# Patient Record
Sex: FEMALE | Race: WHITE | NOT HISPANIC OR LATINO | Employment: UNEMPLOYED | URBAN - METROPOLITAN AREA
[De-identification: names, ages, dates, MRNs, and addresses within clinical notes are randomized per-mention and may not be internally consistent; named-entity substitution may affect disease eponyms.]

---

## 2022-03-01 ENCOUNTER — APPOINTMENT (OUTPATIENT)
Dept: CT IMAGING | Facility: CLINIC | Age: 62
End: 2022-03-01
Payer: COMMERCIAL

## 2022-03-01 ENCOUNTER — HOSPITAL ENCOUNTER (EMERGENCY)
Facility: CLINIC | Age: 62
Discharge: HOME | End: 2022-03-01
Attending: EMERGENCY MEDICINE | Admitting: EMERGENCY MEDICINE
Payer: COMMERCIAL

## 2022-03-01 VITALS
RESPIRATION RATE: 11 BRPM | HEART RATE: 61 BPM | BODY MASS INDEX: 32.49 KG/M2 | TEMPERATURE: 98.3 F | OXYGEN SATURATION: 95 % | SYSTOLIC BLOOD PRESSURE: 146 MMHG | WEIGHT: 195 LBS | HEIGHT: 65 IN | DIASTOLIC BLOOD PRESSURE: 94 MMHG

## 2022-03-01 DIAGNOSIS — R42 DIZZINESS: Primary | ICD-10-CM

## 2022-03-01 DIAGNOSIS — R07.9 CHEST PAIN, UNSPECIFIED TYPE: ICD-10-CM

## 2022-03-01 DIAGNOSIS — J06.9 UPPER RESPIRATORY TRACT INFECTION, UNSPECIFIED TYPE: ICD-10-CM

## 2022-03-01 DIAGNOSIS — E87.1 HYPONATREMIA: ICD-10-CM

## 2022-03-01 LAB
ALBUMIN SERPL-MCNC: 4.3 G/DL (ref 3.4–5)
ALP SERPL-CCNC: 81 U/L (ref 40–150)
ALT SERPL W P-5'-P-CCNC: 20 U/L (ref 0–50)
ANION GAP SERPL CALCULATED.3IONS-SCNC: 5 MMOL/L (ref 3–14)
AST SERPL W P-5'-P-CCNC: 23 U/L (ref 0–45)
ATRIAL RATE - MUSE: 67 BPM
BASOPHILS # BLD AUTO: 0.1 10E3/UL (ref 0–0.2)
BASOPHILS NFR BLD AUTO: 1 %
BILIRUB SERPL-MCNC: 2.1 MG/DL (ref 0.2–1.3)
BUN SERPL-MCNC: 13 MG/DL (ref 7–30)
CALCIUM SERPL-MCNC: 9.3 MG/DL (ref 8.5–10.1)
CHLORIDE BLD-SCNC: 97 MMOL/L (ref 94–109)
CO2 SERPL-SCNC: 29 MMOL/L (ref 20–32)
CREAT SERPL-MCNC: 0.79 MG/DL (ref 0.52–1.04)
DIASTOLIC BLOOD PRESSURE - MUSE: NORMAL MMHG
EOSINOPHIL # BLD AUTO: 0.2 10E3/UL (ref 0–0.7)
EOSINOPHIL NFR BLD AUTO: 4 %
ERYTHROCYTE [DISTWIDTH] IN BLOOD BY AUTOMATED COUNT: 12.8 % (ref 10–15)
GFR SERPL CREATININE-BSD FRML MDRD: 84 ML/MIN/1.73M2
GLUCOSE BLD-MCNC: 101 MG/DL (ref 70–99)
HCT VFR BLD AUTO: 43 % (ref 35–47)
HGB BLD-MCNC: 14.2 G/DL (ref 11.7–15.7)
HOLD SPECIMEN: NORMAL
HOLD SPECIMEN: NORMAL
IMM GRANULOCYTES # BLD: 0 10E3/UL
IMM GRANULOCYTES NFR BLD: 0 %
INTERPRETATION ECG - MUSE: NORMAL
LYMPHOCYTES # BLD AUTO: 1.7 10E3/UL (ref 0.8–5.3)
LYMPHOCYTES NFR BLD AUTO: 28 %
MCH RBC QN AUTO: 30.1 PG (ref 26.5–33)
MCHC RBC AUTO-ENTMCNC: 33 G/DL (ref 31.5–36.5)
MCV RBC AUTO: 91 FL (ref 78–100)
MONOCYTES # BLD AUTO: 0.4 10E3/UL (ref 0–1.3)
MONOCYTES NFR BLD AUTO: 6 %
NEUTROPHILS # BLD AUTO: 3.6 10E3/UL (ref 1.6–8.3)
NEUTROPHILS NFR BLD AUTO: 61 %
NRBC # BLD AUTO: 0 10E3/UL
NRBC BLD AUTO-RTO: 0 /100
P AXIS - MUSE: 75 DEGREES
PLATELET # BLD AUTO: 291 10E3/UL (ref 150–450)
POTASSIUM BLD-SCNC: 4.1 MMOL/L (ref 3.4–5.3)
PR INTERVAL - MUSE: 182 MS
PROT SERPL-MCNC: 8.6 G/DL (ref 6.8–8.8)
QRS DURATION - MUSE: 92 MS
QT - MUSE: 420 MS
QTC - MUSE: 443 MS
R AXIS - MUSE: 69 DEGREES
RBC # BLD AUTO: 4.72 10E6/UL (ref 3.8–5.2)
SODIUM SERPL-SCNC: 131 MMOL/L (ref 133–144)
SYSTOLIC BLOOD PRESSURE - MUSE: NORMAL MMHG
T AXIS - MUSE: 65 DEGREES
TROPONIN I SERPL HS-MCNC: 9 NG/L
VENTRICULAR RATE- MUSE: 67 BPM
WBC # BLD AUTO: 5.9 10E3/UL (ref 4–11)

## 2022-03-01 PROCEDURE — 96374 THER/PROPH/DIAG INJ IV PUSH: CPT

## 2022-03-01 PROCEDURE — 70450 CT HEAD/BRAIN W/O DYE: CPT

## 2022-03-01 PROCEDURE — 96361 HYDRATE IV INFUSION ADD-ON: CPT

## 2022-03-01 PROCEDURE — 84484 ASSAY OF TROPONIN QUANT: CPT | Performed by: EMERGENCY MEDICINE

## 2022-03-01 PROCEDURE — 250N000011 HC RX IP 250 OP 636

## 2022-03-01 PROCEDURE — 99285 EMERGENCY DEPT VISIT HI MDM: CPT | Mod: 25

## 2022-03-01 PROCEDURE — 36415 COLL VENOUS BLD VENIPUNCTURE: CPT | Performed by: EMERGENCY MEDICINE

## 2022-03-01 PROCEDURE — 93005 ELECTROCARDIOGRAM TRACING: CPT

## 2022-03-01 PROCEDURE — 258N000003 HC RX IP 258 OP 636

## 2022-03-01 PROCEDURE — 85025 COMPLETE CBC W/AUTO DIFF WBC: CPT | Performed by: EMERGENCY MEDICINE

## 2022-03-01 PROCEDURE — 80053 COMPREHEN METABOLIC PANEL: CPT | Performed by: EMERGENCY MEDICINE

## 2022-03-01 RX ORDER — KETOROLAC TROMETHAMINE 15 MG/ML
30 INJECTION, SOLUTION INTRAMUSCULAR; INTRAVENOUS ONCE
Status: COMPLETED | OUTPATIENT
Start: 2022-03-01 | End: 2022-03-01

## 2022-03-01 RX ORDER — DIAZEPAM 5 MG
2.5 TABLET ORAL EVERY 6 HOURS PRN
Qty: 10 TABLET | Refills: 0 | Status: SHIPPED | OUTPATIENT
Start: 2022-03-01

## 2022-03-01 RX ADMIN — SODIUM CHLORIDE 1000 ML: 9 INJECTION, SOLUTION INTRAVENOUS at 09:17

## 2022-03-01 RX ADMIN — KETOROLAC TROMETHAMINE 30 MG: 15 INJECTION, SOLUTION INTRAMUSCULAR; INTRAVENOUS at 09:18

## 2022-03-01 ASSESSMENT — ENCOUNTER SYMPTOMS
NUMBNESS: 0
FEVER: 0
SHORTNESS OF BREATH: 0
SPEECH DIFFICULTY: 0
FATIGUE: 1
ABDOMINAL PAIN: 0
SORE THROAT: 0
HEADACHES: 1
NAUSEA: 1
FACIAL ASYMMETRY: 0
CONFUSION: 0
FREQUENCY: 0
COUGH: 1
NECK STIFFNESS: 1
VOMITING: 0
DYSURIA: 0

## 2022-03-01 NOTE — ED PROVIDER NOTES
History   Chief Complaint:  Generalized Weakness and Dizziness       HPI   Barby Heaton is a 61 year old female with history of vertigo and cervical stenosis who presents with a constellation of symptoms including, dizziness numbness and tingling in all extremities, weakness in arms fatigue.  The patient states she has had a bad cold and was seen on 2/21 in clinic for a fever of 101, cough and congestion.  Covid test was negative. She was seen again by Dr. Champagne at [facility] 2/25 for congestion and coughing and was prescribed a Z-Ryan, (which she did not finish) and prednisone (which she only took for 2 days because she did not like the way it made her feel).  On chart review, she did not take Azithromycin because she was concerned about QT prolongation risk. She comes in today complaining that when she got up at 4:30 AM, she experienced a hot flash feeling and a feeling like the world was going in slow motion. It was not like the room was spinning. She has pressure over the frontal aspect of her head and her neck feels somewhat stiff.  She has a history of vertigo and exercises for this and has been to vestibular therapy.  She states this constellation of symptoms feels worse than her typical vertigo episodes, in that she has experienced intermittent numbness and tingling and she feels very weak and tired.  She does not feel stable and feels like she may fall.  She also mentions that over the past week she has felt some discomfort in her chest that comes and goes.  She still has a lingering cough but has not had fevers in the past few days.    Review of Systems   Constitutional: Positive for fatigue. Negative for fever.   HENT: Positive for congestion. Negative for ear pain and sore throat.    Eyes: Negative for visual disturbance.   Respiratory: Positive for cough. Negative for shortness of breath.    Cardiovascular: Positive for chest pain.   Gastrointestinal: Positive for nausea. Negative for  "abdominal pain and vomiting.   Genitourinary: Negative for dysuria and frequency.   Musculoskeletal: Positive for gait problem and neck stiffness.   Neurological: Positive for headaches. Negative for syncope, facial asymmetry, speech difficulty and numbness.   Psychiatric/Behavioral: Negative for confusion.   All other systems reviewed and are negative.    Allergies:  No Known Allergies    Medications:  Lexapro    Past Medical History:     Anxiety      Past Surgical History:    No past surgical history on file.     Family History:    Mother- lung cancer  No family history of CVA or heart disease    Social History:  Single  Non smoker  No ETOH use  Follows with Dr. Ihsan Chan [facility]      Physical Exam     Patient Vitals for the past 24 hrs:   BP Temp Temp src Pulse Resp SpO2 Height Weight   03/01/22 1000 -- -- -- 61 11 95 % -- --   03/01/22 0959 -- -- -- 62 13 98 % -- --   03/01/22 0800 (!) 146/94 -- -- 63 16 -- -- --   03/01/22 0750 (!) 131/91 -- -- 64 14 -- -- --   03/01/22 0700 (!) 153/95 -- -- 74 -- 98 % -- --   03/01/22 0656 -- 98.3  F (36.8  C) Oral 76 16 96 % 1.651 m (5' 5\") 88.5 kg (195 lb)     Physical Exam  General: Adult female laying back on [facility] with lights off in the room.  HENT: Patient wearing face mask. When taken off, mucous membranes appear moist. TMs normal appearing- no erythema. Posterior oropharynx non erythematous, no exudate, uvula midline. No facial droop.  Eyes: Conjunctive and sclera clear. PERRLA. EOMs intact. No nystagmus.   Neck: Worsening dizziness illicited on bringing chin to chest on exam. No nuchal rigidity.  CV: Regular rate and rhythm. Normal S1, S2. No appreciable murmurs, gallops or rubs.  Resp: Lungs clear to auscultation bilaterally. Normal respiratory effort. Speaks in full sentences. No stridor or cough observed.  GI: Abdomen soft, non distended and nontender. No rebound or guarding.  MSK: Moves all extremities without difficulty. No lower extremity " edema.  Skin: Warm and dry.  Neuro: Awake, alert, oriented x3. Cranial nerves II - XII intact.Normal and fluent speech. Normal finger . Equal strength in all extremities.   Psych: Cooperative. Normal affect.    Emergency Department Course   ECG  ECG obtained at 0703, ECG read at 0705  Normal sinus rhythm. Normal ECG.  Rate 67 bpm. NC interval 182 ms. QRS duration 92 ms. QT/QTc 420/443 ms. P-R-T axes 75 69 65     Imaging:  Head CT w/o contrast   Final Result   IMPRESSION:   1.  No evidence of acute intracranial hemorrhage or mass effect.   2.  Mild nonspecific white matter changes.   3.  Mild brain parenchymal volume loss.      ZARINA GOLDBERG MD            SYSTEM ID:  [id number]        Report per radiology    Laboratory:  Labs Ordered and Resulted from Time of ED Arrival to Time of ED Departure   COMPREHENSIVE METABOLIC PANEL - Abnormal       Result Value    Sodium 131 (*)     Potassium 4.1      Chloride 97      Carbon Dioxide (CO2) 29      Anion Gap 5      Urea Nitrogen 13      Creatinine 0.79      Calcium 9.3      Glucose 101 (*)     Alkaline Phosphatase 81      AST 23      ALT 20      Protein Total 8.6      Albumin 4.3      Bilirubin Total 2.1 (*)     GFR Estimate 84     TROPONIN I - Normal    Troponin I High Sensitivity 9     CBC WITH PLATELETS AND DIFFERENTIAL    WBC Count 5.9      RBC Count 4.72      Hemoglobin 14.2      Hematocrit 43.0      MCV 91      MCH 30.1      MCHC 33.0      RDW 12.8      Platelet Count 291      % Neutrophils 61      % Lymphocytes 28      % Monocytes 6      % Eosinophils 4      % Basophils 1      % Immature Granulocytes 0      NRBCs per 100 WBC 0      Absolute Neutrophils 3.6      Absolute Lymphocytes 1.7      Absolute Monocytes 0.4      Absolute Eosinophils 0.2      Absolute Basophils 0.1      Absolute Immature Granulocytes 0.0      Absolute NRBCs 0.0        Emergency Department Course:    Reviewed:  I reviewed nursing notes, vitals and past medical history    Assessments:  0845 I  obtained history and examined the patient as noted above. I spoke to my attending physician about her case after.    1000   My attending provider rechecked the patient and explained findings of her labs, EKG and head CT       Interventions:  Medications   0.9% sodium chloride BOLUS (0 mLs Intravenous Stopped 3/1/22 1005)   ketorolac (TORADOL) injection 30 mg (30 mg Intravenous Given 3/1/22 0918)     Disposition:  The patient was discharged to home.     Impression & Plan     Medical Decision Making:  I saw and evaluated Barby for dizziness, headache, continuing cold symptoms, intermittent chest pain per detailed HPI above. Regarding her intermittent chest pain, her Troponin was negative and ECG demonstrated normal sinus rhythm, which was reassuring this does not represent acute coronary syndrome. She felt her dizziness was worse/different than her typical vertigo. Therefore, head CT was obtained and returned negative for infarction or any acute pathology. She felt reassured about this normal imaging. She had a completely normal neurologic exam. Regarding her typical dizziness episodes, she states Meclizine does not work for her and asks specifically for a low dose diazepam prescription. She states this has worked for her in the past for Vertigo. I also referred her to Vestibular Therapy to follow up with for PT. She was already seen for her URI in clinic recently. She was afebrile here and had no leukocytosis on CBC. Lungs were clear to auscultation bilaterally and it does not seem this has progressed to a pneumonia. Metabolic panel showed mildly low sodium and she was provided NS bolus to help replete. She was also given Toradol for her headache, which helped quite a bit. I recommended 650Tylenol + 400Ibuprofen q6 hours at home for headache management. Regarding her neck pain, she had a recent MRI showing some cervical stenosis. I referred her to Dr. Patel at [facility] to follow up with about her chronic neck pain.  Clinically, I discussed with my attending physician that her picture does not look like meningitis. She is afebrile, has a normal WBC, no neurologic dysfunction on exam and no impressive nuchal rigidity. All lab and imaging results were discussed and she was discharged hemodynamically stable in improved condition. She was able to ambulate in the room and expressed appreciation for the referrals and work up done. She will also follow up this week with Dr. Champagne for recheck. She knows she can return here with any new or worsening symptoms.    Diagnosis:    ICD-10-CM    1. Dizziness  R42 Physical Therapy Referral   2. Chest pain, unspecified type  R07.9    3. Upper respiratory tract infection, unspecified type  J06.9    4. Hyponatremia  E87.1     131 on labs 3/2/22, given NS bolus       Discharge Medications:  Discharge Medication List as of 3/1/2022 10:18 AM      START taking these medications    Details   diazepam (VALIUM) 5 MG tablet Take 0.5 tablets (2.5 mg) by mouth every 6 hours as needed for anxiety, Disp-10 tablet, R-0, Local Print             Glenda CASAREZ APC-T  I saw and evaluated this patient under attending provider Dr. Trierweiler Dewing, Glenda GASTELUM PA-C  03/01/22 1415

## 2022-03-01 NOTE — ED NOTES
Emergency Department Attending Supervision Note  3/1/2022  8:08 AM      I evaluated this patient in conjunction with Glenda Perrin PA-C      Briefly, the patient presented with weakness and dizziness. The patient presented one week ago to the clinic with fever, nasal congestion and headache. She tested negative for COVID-19, and was prescribed with azithromycin and prednisone. She has not finished azithromycin, and only took two days of her prednisone because it did not make her feel better. This morning at around 0430 she woke up with hot flashes. She felt whole body tingling, and felt stiff. She had intermittent tingling and numbness in the upper and lower extremities. She felt dizzy, and felt pressure in the frontal aspect of her head. She had neck stiffness, but is unsure if it is from her baseline cervical stenosis. Dizziness worsens with turning her head.       On my exam, ***    Results:    ED course:   I obtained history and examined patient    My impression is ***        Diagnosis  No diagnosis found.      Trierweiler, Chad A, MD

## 2022-03-01 NOTE — ED TRIAGE NOTES
Patient states feeling weak, dizzy.  Headache & neck pain.  Still having chest pain which has been intermittent since last week.  Chest cold, cough also.

## 2022-03-01 NOTE — ED PROVIDER NOTES
Emergency Department Attending Supervision Note  3/1/2022  8:08 AM      I evaluated this patient in conjunction with Glenda العلي PA-C.      Briefly, the patient presented with generalized weakness and lightheadedness.  She has been dealing with an upper respiratory infection, not feeling improved with prednisone and a Z-Ryan.  She is also been dealing with chronic neck pain.  With her vertigo feeling different than typical today, she presented to us to make sure there was not something more serious at play.      On my exam, she is somewhat anxious but otherwise resting comfortably in no obvious distress.  Cardiac: Regular rate and rhythm  Lungs: Clear to auscultation  Abd: Soft and benign  Neuro: 5 out of 5 strength in all extremities.  No cranial nerve deficits.  Able to ambulate without assistance.    Results: Thorough work-up was undertaken including unremarkable labs except for a mild hyponatremia at 131.  Head CT was obtained which shows no evidence of an intracranial process. EKG is normal.      My impression is lightheadedness and dizziness with unclear cause.  She was given a liter of fluid with some improvement in symptoms.  She finds that low-dose Valium seems to help with her symptoms of vertigo and lightheadedness and this will be prescribed.  Otherwise, we will have her follow-up with orthopedics for her neck pain.  I see no emergent pathology at this time and feel she is safe for discharge home.        Diagnosis    ICD-10-CM    1. Dizziness  R42 Physical Therapy Referral   2. Chest pain, unspecified type  R07.9    3. Upper respiratory tract infection, unspecified type  J06.9    4. Hyponatremia  E87.1     131 on labs 3/2/22, given NS bolus            Trierweiler, Chad A, MD  03/03/22 1014

## 2022-03-01 NOTE — DISCHARGE INSTRUCTIONS
Discharge Instructions  Dizziness (Lightheaded)  Today you were seen for dizziness.  Dizziness can be caused by many things and it can be very difficult to determine the cause of dizziness.  At this time, your provider has found no signs that your dizziness is due to a serious or life-threatening condition. However, sometimes there is a serious problem that does not show up right away, and it is important for you to follow up with your regular provider as instructed.  Generally, every Emergency Department visit should have a follow-up clinic visit with either a primary or a specialty clinic/provider. Please follow-up as instructed by your emergency provider today.      Return to the Emergency Department if:    You pass out (fainting or falling out), especially during exercise.    You develop chest pain, chest pressure or difficulty breathing.  Your feel an irregular heartbeat.  You have excessive vaginal bleeding, or blood in your stool or vomit (throw up).  You have a high fever.  Your symptoms get worse or more frequent.    If when you begin to feel dizzy or lightheaded, it is important to sit down or lay down immediately to prevent injury from falling.  If you were given a prescription for medicine here today, be sure to read all of the information (including the package insert) that comes with your prescription.  This will include important information about the medicine, its side effects, and any warnings that you need to know about.  The pharmacist who fills the prescription can provide more information and answer questions you may have about the medicine.  If you have questions or concerns that the pharmacist cannot address, please call or return to the Emergency Department.   Remember that you can always come back to the Emergency Department if you are not able to see your regular provider in the amount of time listed above, if you get any new symptoms, or if there is anything that worries  you.      Discharge Instructions  Chest Pain    You have been seen today for chest pain or discomfort.  At this time, your provider has found no signs that your chest pain is due to a serious or life-threatening condition, (or you have declined more testing and/or admission to the hospital). However, sometimes there is a serious problem that does not show up right away. Your evaluation today may not be complete and you may need further testing and evaluation.     Generally, every Emergency Department visit should have a follow-up clinic visit with either a primary or a specialty clinic/provider. Please follow-up as instructed by your emergency provider today.  Return to the Emergency Department if:  Your chest pain changes, gets worse, starts to happen more often, or comes with less activity.  You are newly short of breath.  You get very weak or tired.  You pass out or faint.  You have any new symptoms, like fever, cough, numb legs, or you cough up blood.  You have anything else that worries you.    Until you follow-up with your regular provider, please do the following:  Take one aspirin daily unless you have an allergy or are told not to by your provider.  If a stress test appointment has been made, go to the appointment.  If you have questions, contact your regular provider.  Follow-up with your regular provider/clinic as directed; this is very important.    If you were given a prescription for medicine here today, be sure to read all of the information (including the package insert) that comes with your prescription.  This will include important information about the medicine, its side effects, and any warnings that you need to know about.  The pharmacist who fills the prescription can provide more information and answer questions you may have about the medicine.  If you have questions or concerns that the pharmacist cannot address, please call or return to the Emergency Department.       Remember that you can  always come back to the Emergency Department if you are not able to see your regular provider in the amount of time listed above, if you get any new symptoms, or if there is anything that worries you.

## 2024-10-09 ENCOUNTER — LAB REQUISITION (OUTPATIENT)
Dept: LAB | Facility: CLINIC | Age: 64
End: 2024-10-09

## 2024-10-09 DIAGNOSIS — Z12.4 ENCOUNTER FOR SCREENING FOR MALIGNANT NEOPLASM OF CERVIX: ICD-10-CM

## 2024-10-09 PROCEDURE — 87624 HPV HI-RISK TYP POOLED RSLT: CPT | Performed by: PHYSICIAN ASSISTANT

## 2024-10-09 PROCEDURE — G0145 SCR C/V CYTO,THINLAYER,RESCR: HCPCS | Performed by: PHYSICIAN ASSISTANT

## 2024-10-10 LAB
HPV HR 12 DNA CVX QL NAA+PROBE: NEGATIVE
HPV16 DNA CVX QL NAA+PROBE: NEGATIVE
HPV18 DNA CVX QL NAA+PROBE: NEGATIVE
HUMAN PAPILLOMA VIRUS FINAL DIAGNOSIS: NORMAL

## 2024-10-14 LAB
BKR AP ASSOCIATED HPV REPORT: NORMAL
BKR LAB AP GYN ADEQUACY: NORMAL
BKR LAB AP GYN INTERPRETATION: NORMAL
BKR LAB AP LMP: NORMAL
BKR LAB AP PREVIOUS ABNL DX: NORMAL
BKR LAB AP PREVIOUS ABNORMAL: NORMAL
PATH REPORT.COMMENTS IMP SPEC: NORMAL
PATH REPORT.COMMENTS IMP SPEC: NORMAL
PATH REPORT.RELEVANT HX SPEC: NORMAL